# Patient Record
Sex: FEMALE | Race: WHITE | NOT HISPANIC OR LATINO | Employment: OTHER | URBAN - METROPOLITAN AREA
[De-identification: names, ages, dates, MRNs, and addresses within clinical notes are randomized per-mention and may not be internally consistent; named-entity substitution may affect disease eponyms.]

---

## 2017-02-08 ENCOUNTER — GENERIC CONVERSION - ENCOUNTER (OUTPATIENT)
Dept: OTHER | Facility: OTHER | Age: 82
End: 2017-02-08

## 2017-03-03 ENCOUNTER — ALLSCRIPTS OFFICE VISIT (OUTPATIENT)
Dept: OTHER | Facility: OTHER | Age: 82
End: 2017-03-03

## 2017-03-03 DIAGNOSIS — M79.10 MYALGIA: ICD-10-CM

## 2017-03-03 DIAGNOSIS — M48.02 SPINAL STENOSIS OF CERVICAL REGION: ICD-10-CM

## 2017-03-31 ENCOUNTER — ALLSCRIPTS OFFICE VISIT (OUTPATIENT)
Dept: OTHER | Facility: OTHER | Age: 82
End: 2017-03-31

## 2017-04-05 ENCOUNTER — GENERIC CONVERSION - ENCOUNTER (OUTPATIENT)
Dept: OTHER | Facility: OTHER | Age: 82
End: 2017-04-05

## 2017-04-13 ENCOUNTER — ALLSCRIPTS OFFICE VISIT (OUTPATIENT)
Dept: RADIOLOGY | Facility: CLINIC | Age: 82
End: 2017-04-13
Payer: MEDICARE

## 2017-04-27 ENCOUNTER — ALLSCRIPTS OFFICE VISIT (OUTPATIENT)
Dept: RADIOLOGY | Facility: CLINIC | Age: 82
End: 2017-04-27
Payer: MEDICARE

## 2017-05-19 ENCOUNTER — ALLSCRIPTS OFFICE VISIT (OUTPATIENT)
Dept: OTHER | Facility: OTHER | Age: 82
End: 2017-05-19

## 2017-05-19 DIAGNOSIS — M85.88 OTHER SPECIFIED DISORDERS OF BONE DENSITY AND STRUCTURE, OTHER SITE: ICD-10-CM

## 2017-05-19 DIAGNOSIS — M46.1 SACROILIITIS, NOT ELSEWHERE CLASSIFIED (HCC): ICD-10-CM

## 2017-05-25 ENCOUNTER — ALLSCRIPTS OFFICE VISIT (OUTPATIENT)
Dept: RADIOLOGY | Facility: CLINIC | Age: 82
End: 2017-05-25
Payer: MEDICARE

## 2017-06-06 ENCOUNTER — GENERIC CONVERSION - ENCOUNTER (OUTPATIENT)
Dept: OTHER | Facility: OTHER | Age: 82
End: 2017-06-06

## 2018-01-12 VITALS
TEMPERATURE: 97.9 F | HEIGHT: 61 IN | BODY MASS INDEX: 30.21 KG/M2 | DIASTOLIC BLOOD PRESSURE: 68 MMHG | HEART RATE: 76 BPM | WEIGHT: 160 LBS | SYSTOLIC BLOOD PRESSURE: 138 MMHG

## 2018-01-13 VITALS
TEMPERATURE: 98.3 F | WEIGHT: 160 LBS | BODY MASS INDEX: 30.21 KG/M2 | DIASTOLIC BLOOD PRESSURE: 62 MMHG | HEART RATE: 76 BPM | SYSTOLIC BLOOD PRESSURE: 118 MMHG | HEIGHT: 61 IN

## 2018-01-14 VITALS
HEART RATE: 72 BPM | BODY MASS INDEX: 30.4 KG/M2 | DIASTOLIC BLOOD PRESSURE: 70 MMHG | WEIGHT: 161 LBS | SYSTOLIC BLOOD PRESSURE: 136 MMHG | HEIGHT: 61 IN

## 2018-01-16 NOTE — MISCELLANEOUS
Message   Recorded as Task   Date: 11/03/2016 09:08 AM, Created By: Michele Gao   Task Name: Miscellaneous   Assigned To: [employer] GUY,TEAM   Regarding Patient: Darryl Villalba, Status: Active   Comment:    Marla Covington - 03 Nov 2016 9:08 AM     TASK CREATED  Caller: Self; [phone] (Home)  pt called to see about another back injec last inj was LESI 10/29/2015   pt c/o same pain as in the past more to the left side non radiating  Dtr gave her a patch to put on does not remember what it called but that helped  Rosella Goldmann Rosella Goldmann Does she need a OV or can we scheduled pt for another inject  Pt is on Plavix and ASA    Rosella Goldmann Will need to send a new hold form  Hernando Montesinos - 03 Nov 2016 9:20 AM     TASK REPLIED TO: Previously Assigned To Hernando Montesinos  can schedule a repeat but needs holds   Marla Covington - 03 Nov 2016 9:27 AM     TASK REASSIGNED: Previously Assigned To Marla Covington  Faxed hold form to Dr Paola Ocasio (plavix/ASA)    schedule pt for Danuta Clifton - 11 Nov 2016 2:24 PM     TASK EDITED  Received ok to hold plavix x 7 days prior to procedure and asa x 6 days prior to procedure signed by Dr Saurav Galaviz on 11/7/2016    s/w pt, advised of above  Pt verbalized understanding  Last plavix was 11/10  Scheduled LESI 11/18 at 0915  Reviewed pre procedure instructions; eat a light meal - npo 1 hour prior to procedure, loose fitting clothing, c/b if illness / abx start prior to procedure, no plavix 11/11 - 11/18 post procedure  No asa 11/12 - 11/18 post procedure  pt verbalized understanding and denied additional blood thinning medication  Active Problems    1  Ambulatory dysfunction (719 7) (R26 2)   2  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)   3  Cervical spinal stenosis (723 0) (M48 02)   4  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)   5  Disorder of sacrum (724 6) (M53 3)   6  Knee arthropathy (716 96) (M12 9)   7  LBP (low back pain) (724 2) (M54 5)   8   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)   9  Neck pain (723 1) (M54 2)    Current Meds   1  B Complex Oral Tablet; Therapy: (Recorded:14May2013) to Recorded   2  Bydureon SUSR; Therapy: (Recorded:14May2013) to Recorded   3  Clopidogrel Bisulfate 75 MG Oral Tablet; Therapy: 23KZV0323 to (Evaluate:52Qjs2599) Recorded   4  Co-Enzyme Q-10 100 MG Oral Capsule; Therapy: (Recorded:14May2013) to Recorded   5  [employer] Vitamin D 2000 UNIT CAPS; Therapy: (Recorded:14May2013) to Recorded   6  Invokana 100 MG Oral Tablet; Therapy: 09KMA4197 to (Evaluate:27May2015) Recorded   7  Levothyroxine Sodium 25 MCG Oral Tablet; Therapy: 63ZGR7106 to Recorded   8  Lipitor 20 MG Oral Tablet (Atorvastatin Calcium); Therapy: (Recorded:14May2013) to Recorded   9  Losartan Potassium 25 MG Oral Tablet; Therapy: 43WIF1777 to (Evaluate:27May2015) Recorded   10  MiraLax Oral Packet (Polyethylene Glycol 3350); Therapy: (Recorded:14May2013) to Recorded   11  Pennsaid 2 % Transdermal Solution; Apply 2 pumps to Right Knee BID; Therapy: 13OTV7784 to (Evaluate:85Loa5210)  Requested for: 0490 51 30 85; Last    Rx:21Oct2015 Ordered   12  Pioglitazone HCl - 15 MG Oral Tablet; Therapy: 46RXD1515 to Recorded   13  TraMADol HCl - 50 MG Oral Tablet; Take 1 tablet 3 times daily as needed Recorded   14  Tylenol 325 MG Oral Tablet; Therapy: (Recorded:14May2013) to Recorded   15  Verapamil HCl  MG Oral Tablet Extended Release; Therapy: 63WBV0010 to Recorded    Allergies    1   Sulfa Drugs    Signatures   Electronically signed by : Chet Keita, ; Nov 11 2016  2:25PM EST                       (Author)

## 2018-01-18 NOTE — MISCELLANEOUS
Message   Recorded as Task   Date: 03/31/2017 02:41 PM, Created By: Trevon Peters   Task Name: Care Coordination   Assigned To: [facility] [place] [facility],Team   Regarding Patient: Unknown Bone, Status: Active   Comment:    Marla Covington - 31 Mar 2017 2:41 PM     TASK CREATED  faxed hold form to Dr Bharath George (Plavix & ASA)    schedule PRASANNA X2   Danuta Melendrez - 05 Apr 2017 8:59 AM     TASK EDITED  Received ok to hold plavix X 7 days prior to procedure and asa x 6 days prior to procedure signed by Dr Bharath George 4/3/2017   Danuta Melendrez - 05 Apr 2017 10:21 AM     TASK EDITED  s/w pt, scheduled prasanna #1 - plavix, asa on 4/13, prasanna #2 - plavix, asa on 4/27  reviewed pre procedure instructions; eat a light meal - npo 1 hour prior, , loose fitting clothing, c/b if illness / abx start prior to procedure, no plavix 4/6 - 4/13 post procedure #1  No asa 4/7 - 4/13 post procedure #1  No plavix 4/20 - 4/27 post procedure #2, No asa 4/21 - 4/27 post procedure#2  Pt verbalized understanding and appreciation  Will cb if questions / concerns arise  Active Problems    1  Ambulatory dysfunction (719 7) (R26 2)   2  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)   3  Cervical myofascial pain syndrome (729 1) (M79 1)   4  Cervical radiculitis (723 4) (M54 12)   5  Cervical spinal stenosis (723 0) (M48 02)   6  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)   7  Disorder of sacrum (724 6) (M53 3)   8  Knee arthropathy (716 96) (M12 9)   9  LBP (low back pain) (724 2) (M54 5)   10  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) (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 83)   11  Neck pain (723 1) (M54 2)   12  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)    Current Meds   1  Aspirin 81 MG Oral Tablet Delayed Release Recorded   2  B Complex Oral Tablet; Therapy: (Recorded:21Hek3983) to Recorded   3  Bydureon SUSR; Therapy: (Recorded:28Bdt1734) to Recorded   4  Clopidogrel Bisulfate 75 MG Oral Tablet;    Therapy: 51FMT2024 to (Evaluate:27May2015) Recorded   5  [employer]S Vitamin D 2000 UNIT CAPS; Therapy: (Recorded:14May2013) to Recorded   6  Diclofenac Sodium 1 % Transdermal Gel; Apply 2 - 4 grams to affected area qid prn; Therapy: 00WBI1389 to (Evaluate:30Apr2017); Last Rx:31Mar2017 Ordered   7  Fish Oil CAPS Recorded   8  Gabapentin 300 MG Oral Capsule Recorded   9  Invokana 100 MG Oral Tablet; Therapy: 48ODQ7061 to (Evaluate:27May2015) Recorded   10  Meclizine HCl - 25 MG Oral Tablet Recorded   11  MiraLax Oral Packet (Polyethylene Glycol 3350); Therapy: (Recorded:14May2013) to Recorded   12  Pioglitazone HCl - 15 MG Oral Tablet; Therapy: 87UBX0334 to Recorded   13  Tylenol 325 MG Oral Tablet; Therapy: (Recorded:14May2013) to Recorded    Allergies    1   Sulfa Drugs    Signatures   Electronically signed by : Deepti Santamaria, ; Apr 5 2017 10:21AM EST                       (Author)

## 2018-01-18 NOTE — MISCELLANEOUS
Message   Recorded as Task   Date: 02/08/2017 01:54 PM, Created By: Erica Kaye   Task Name: Miscellaneous   Assigned To: Erica Kaye   Regarding Patient: Rd Purvis, Status: Active   CommentKit Campuzano - 08 Feb 2017 1:54 PM     TASK CREATED  Patient called to cancel her appt  2/10/17 @ 1:15 PM with Dr Bess Saleh  The patient's  is now in hospice  The patient will call to reschedule when she is able to come into the office  Hernando Montesinos - 08 Feb 2017 1:55 PM     TASK REPLIED TO: Previously Assigned To Hernando Montesinos  [place]        Active Problems    1  Ambulatory dysfunction (719 7) (R26 2)   2  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)   3  Cervical spinal stenosis (723 0) (M48 02)   4  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)   5  Disorder of sacrum (724 6) (M53 3)   6  Knee arthropathy (716 96) (M12 9)   7  LBP (low back pain) (724 2) (M54 5)   8  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) (M99 83)   9  Neck pain (723 1) (M54 2)    Current Meds   1  B Complex Oral Tablet; Therapy: (Recorded:14May2013) to Recorded   2  Bydureon SUSR; Therapy: (Recorded:14May2013) to Recorded   3  Clopidogrel Bisulfate 75 MG Oral Tablet; Therapy: 85TLM5386 to (Evaluate:28Ayu9859) Recorded   4  Co-Enzyme Q-10 100 MG Oral Capsule; Therapy: (Recorded:14May2013) to Recorded   5  [employer]S Vitamin D 2000 UNIT CAPS; Therapy: (Recorded:14May2013) to Recorded   6  Invokana 100 MG Oral Tablet; Therapy: 28RLC5731 to (Evaluate:25Xwh6887) Recorded   7  Levothyroxine Sodium 25 MCG Oral Tablet; Therapy: 11QEP4140 to Recorded   8  Lipitor 20 MG Oral Tablet (Atorvastatin Calcium); Therapy: (Recorded:14May2013) to Recorded   9  Losartan Potassium 25 MG Oral Tablet; Therapy: 73SIL0299 to (Evaluate:11Aji5293) Recorded   10  MiraLax Oral Packet (Polyethylene Glycol 3350); Therapy: (Recorded:34Uuh0693) to Recorded   11  Pennsaid 2 % Transdermal Solution; Apply 2 pumps to Right Knee BID;     Therapy: 45FSX7366 to ([place])  Requested for: 99FGB8811; Last    Rx:73Dsy4236 Ordered   12  Pioglitazone HCl - 15 MG Oral Tablet; Therapy: 33TIR9023 to Recorded   13  TraMADol HCl - 50 MG Oral Tablet; Take 1 tablet 3 times daily as needed Recorded   14  Tylenol 325 MG Oral Tablet; Therapy: (Recorded:28Xki6104) to Recorded   15  Verapamil HCl  MG Oral Tablet Extended Release; Therapy: 04PMV1137 to Recorded    Allergies    1  Sulfa Drugs    Signatures   Electronically signed by :  Alpa White, ; Feb 8 2017  1:57PM EST                       (Author)